# Patient Record
Sex: FEMALE | URBAN - METROPOLITAN AREA
[De-identification: names, ages, dates, MRNs, and addresses within clinical notes are randomized per-mention and may not be internally consistent; named-entity substitution may affect disease eponyms.]

---

## 2019-08-20 ENCOUNTER — APPOINTMENT (RX ONLY)
Dept: URBAN - METROPOLITAN AREA CLINIC 176 | Facility: CLINIC | Age: 49
Setting detail: DERMATOLOGY
End: 2019-08-20

## 2019-08-20 DIAGNOSIS — Z41.9 ENCOUNTER FOR PROCEDURE FOR PURPOSES OTHER THAN REMEDYING HEALTH STATE, UNSPECIFIED: ICD-10-CM

## 2019-08-20 DIAGNOSIS — L57.8 OTHER SKIN CHANGES DUE TO CHRONIC EXPOSURE TO NONIONIZING RADIATION: ICD-10-CM

## 2019-08-20 PROCEDURE — ? PRESCRIPTION

## 2019-08-20 PROCEDURE — ? BOTOX

## 2019-08-20 RX ORDER — TRETINOIN 0.5 MG/G
CREAM TOPICAL NIGHTLY
Qty: 1 | Refills: 1 | COMMUNITY
Start: 2019-08-20

## 2019-08-20 RX ADMIN — TRETINOIN: 0.5 CREAM TOPICAL at 23:02

## 2019-08-20 NOTE — PROCEDURE: BOTOX
Post-Care Instructions: Patient instructed to not lie down for 30 minutes.  Dynamic movement of treated areas may enhance the effect of the neuromodulator and is recommend every 5 minutes for a 30 min interval.
Glabellar Complex Units: 10
Masseter Units: 0
Price (Use Numbers Only, No Special Characters Or $): 200
Expiration Date (Month Year): 11/2021
Detail Level: Detailed
Dilution (U/0.1 Cc): 5
Lot #: [id number]
Consent: Written consent obtained. Risks include but not limited to lid/brow ptosis, bruising, swelling, diplopia, temporary effect, incomplete chemical denervation.
Additional Area 1 Location: perioral